# Patient Record
Sex: FEMALE | Race: WHITE | NOT HISPANIC OR LATINO | Employment: UNEMPLOYED | URBAN - METROPOLITAN AREA
[De-identification: names, ages, dates, MRNs, and addresses within clinical notes are randomized per-mention and may not be internally consistent; named-entity substitution may affect disease eponyms.]

---

## 2018-02-21 ENCOUNTER — HISTORICAL (OUTPATIENT)
Dept: ADMINISTRATIVE | Facility: HOSPITAL | Age: 28
End: 2018-02-21

## 2018-02-22 LAB — FINAL CULTURE: NORMAL

## 2019-11-03 ENCOUNTER — HISTORICAL (OUTPATIENT)
Dept: ADMINISTRATIVE | Facility: HOSPITAL | Age: 29
End: 2019-11-03

## 2019-11-09 LAB
FINAL CULTURE: NORMAL
FINAL CULTURE: NORMAL

## 2020-12-01 ENCOUNTER — HISTORICAL (OUTPATIENT)
Dept: ADMINISTRATIVE | Facility: HOSPITAL | Age: 30
End: 2020-12-01

## 2020-12-01 LAB
ALBUMIN SERPL-MCNC: 4 GM/DL (ref 3.5–5)
ALBUMIN/GLOB SERPL: 1.1 RATIO (ref 1.1–2)
ALP SERPL-CCNC: 90 UNIT/L (ref 40–150)
ALT SERPL-CCNC: 31 UNIT/L (ref 0–55)
AST SERPL-CCNC: 18 UNIT/L (ref 5–34)
B-HCG SERPL QL: NEGATIVE
BILIRUB SERPL-MCNC: 0.3 MG/DL
BILIRUBIN DIRECT+TOT PNL SERPL-MCNC: 0.1 MG/DL (ref 0–0.5)
BILIRUBIN DIRECT+TOT PNL SERPL-MCNC: 0.2 MG/DL (ref 0–0.8)
BUN SERPL-MCNC: 12 MG/DL (ref 7–18.7)
CALCIUM SERPL-MCNC: 9.5 MG/DL (ref 8.4–10.2)
CHLORIDE SERPL-SCNC: 104 MMOL/L (ref 98–107)
CO2 SERPL-SCNC: 23 MMOL/L (ref 22–29)
CORTIS SERPL-SCNC: 6.8 UG/DL
CREAT SERPL-MCNC: 0.84 MG/DL (ref 0.55–1.02)
DEPRECATED CALCIDIOL+CALCIFEROL SERPL-MC: 21.3 NG/ML (ref 30–80)
EST CREAT CLEARANCE SER (OHS): 86.38 ML/MIN
ESTRADIOL SERPL HS-MCNC: 122 PG/ML
FSH SERPL-ACNC: 2.13 MIU/ML
GLOBULIN SER-MCNC: 3.8 GM/DL (ref 2.4–3.5)
GLUCOSE SERPL-MCNC: 87 MG/DL (ref 74–100)
LH SERPL-ACNC: 1.79 MIU/ML
POTASSIUM SERPL-SCNC: 4 MMOL/L (ref 3.5–5.1)
PROLACTIN LEVEL (OHS): 46.87 NG/ML (ref 5.18–26.53)
PROT SERPL-MCNC: 7.8 GM/DL (ref 6.4–8.3)
SODIUM SERPL-SCNC: 137 MMOL/L (ref 136–145)
T3FREE SERPL-MCNC: 2.88 PG/ML (ref 1.71–3.71)
T4 FREE SERPL-MCNC: 0.69 NG/DL (ref 0.7–1.48)
TSH SERPL-ACNC: 0.67 UIU/ML (ref 0.35–4.94)

## 2020-12-21 ENCOUNTER — HISTORICAL (OUTPATIENT)
Dept: ADMINISTRATIVE | Facility: HOSPITAL | Age: 30
End: 2020-12-21

## 2020-12-21 LAB
CORTIS 30M P CHAL SERPL-SCNC: 15.5 UG/DL
CORTIS SERPL-SCNC: <1 UG/DL

## 2021-01-04 ENCOUNTER — HISTORICAL (OUTPATIENT)
Dept: ADMINISTRATIVE | Facility: HOSPITAL | Age: 31
End: 2021-01-04

## 2021-01-04 LAB
CORTIS 1H P CHAL SERPL-SCNC: 16.6 UG/DL
CORTIS 30M P CHAL SERPL-SCNC: 16.3 UG/DL
CORTIS SERPL-SCNC: 5.5 UG/DL
PROLACTIN LEVEL (OHS): 35.25 NG/ML (ref 5.18–26.53)

## 2022-04-10 ENCOUNTER — HISTORICAL (OUTPATIENT)
Dept: ADMINISTRATIVE | Facility: HOSPITAL | Age: 32
End: 2022-04-10
Payer: MEDICAID

## 2022-04-26 VITALS
HEIGHT: 65 IN | DIASTOLIC BLOOD PRESSURE: 82 MMHG | WEIGHT: 293 LBS | SYSTOLIC BLOOD PRESSURE: 122 MMHG | BODY MASS INDEX: 48.82 KG/M2

## 2022-05-02 DIAGNOSIS — D35.2 HYPERPROLACTINOMA: ICD-10-CM

## 2022-05-02 DIAGNOSIS — E27.40 ADRENAL INSUFFICIENCY: Primary | ICD-10-CM

## 2022-05-03 NOTE — HISTORICAL [facility] CERNER
This is a historical note converted from [employer]. Formatting and pictures may have been removed.  Please reference Oscar for original formatting and attached multimedia. Chief Complaint  referral adrenal insufficiency  History of Present Illness  31yo F with past medical history of Addisons Disease, Tobacco User, and Obesity presents to Endocrine to establish care for adrenal insufficiency. Labs on 8/4/2020 showed baseline cortisol in the AM of <1.0, alpha-subunit pituitary tumor marker of 0.2, estradiol <10, TSH & free T4 WNL, FSH 2.6, LH 0.61, Prolactin level of 31.8, 21-hydroxylase <1. ?However further investigation revealed that she received a steroid injection earlier that day?at another hospital for migraine. MRI Brain w/ & w/o Contrast in 2018? showed empty sella syndrome. Currently on hydrocortisone 20mg TID, ran out of medications about 2 months ago. Have lost ~20lbs, decreased appetite.?Diagnosed 2 years. Used to see Dr. Berger, endocrinologist in  over a year ago. Endorsed generalized fatigue/weakness, diffuse?body aches, nausea, diarrhea, and chronic?HA.?Taking OTC energy supplements called Renal Caps that seem to help. Sees Neurology for chronic headaches. ?Denies any?complicated pregnancies?or deliveries?including hemorrhaging. ?Patient currently has a copper IUD in place. Endorsed irregular periods, LMP 1.5 month ago.  Review of Systems  11 point ROS reviewed and negative except as noted above  Physical Exam  Vitals & Measurements  T:?36.7? ?C (Oral)? HR:?87(Peripheral)? RR:?18? BP:?130/82?  HT:?165.00?cm? WT:?135.000?kg? BMI:?49.59?  General: AAO x3,?well-developed, in no acute distress, obese female sitting comfortably in the chair  Eye: PERRLA, EOMI, clear conjunctiva, eyelids normal, no scleral icterus  Respiratory:? normal respiratory effort, equal breath sounds B/L, clear to auscultation B/L, no wheezes, crackles, or rales  Cardiovascular:?RRR without murmurs, gallops or rubs, normal S1/S1,  normal peripheral pulses  Gastrointestinal:?non-distended, normal BS, soft, non-tender, without masses to palpation  Musculoskeletal:?normal active and passive ROM without pain, no swelling/edema  Integumentary: no rashes or skin lesions present, warm, dry, and normal tone for race  Neurologic: moves all four extremities spontaneously, cognition intact, no facial droop noted, speech clear and coherent  Assessment/Plan  Adrenal Insufficiency  Ordered ACTH level, Cortisol AM level, IGF -1, Prolactin, FSH/LH, TSH/free T4, and Vitamin D  Unclear if patient truly has adrenal insufficiency per records  Will hold off on restarting hydrocortisone at this time  ?  Empty Sella Syndrome  Denies any complications per past pregnancies/deliveries  MRI in 2018 showed no pituitary  ?  RTC prn labs  Referrals  Clinic Follow-up PRN, 12/01/20 11:41:00 CST, F/u TBD based on labs   Problem List/Past Medical History  Ongoing  Hardee disease  Tobacco user  Historical  History of gestational diabetes mellitus  Procedure/Surgical History  Delivery of Products of Conception, External Approach (03/12/2018)  Introduction of Anesthetic Agent into Epidural Space, Percutaneous Approach (03/12/2018)  tubes in ears   Medications  Cheratussin AC 10 mg-100 mg/5 mL oral syrup, 15 mL, Oral, q4hr, PRN,? ?Not taking  hydrocortisone 20 mg oral tablet, 20 mg= 1 tab(s), Oral, TID,? ?Not taking: out  Renal Caps oral capsule, 2 cap(s), Oral, Daily  Allergies  aspirin?(Hives)  Social History  Abuse/Neglect  No, No, Yes, 11/19/2019  Alcohol  Never, 09/19/2017  Employment/School  Unemployed, 03/12/2018  Exercise  Exercise duration: 0., 12/01/2020  Home/Environment  Lives with Children, step mom, and dad, grandma. Alcohol abuse in household: No. Substance abuse in household: No. Smoker in household: No. Injuries/Abuse/Neglect in household: No. Feels unsafe at home: No., 03/12/2018    Never in , 12/01/2020  Sexual  Sexually active: Yes.,  09/19/2017  Spiritual/Cultural  Non denomination, 12/01/2020  Substance Use  Never, 09/19/2017  Tobacco  10 or more cigarettes (1/2 pack or more)/day in last 30 days, Cigarettes, No, 11/19/2019  Family History  Cancer: Father and Brother.  Cervical cancer: Mother.  Diabetes mellitus: Mother and Grandmother.  Heart disease: Grandmother.  Health Maintenance  Health Maintenance  ???Pending?(in the next year)  ??? ??OverDue  ??? ? ? ?Influenza Vaccine due??10/01/20??and every 1??day(s)  ??? ??Due?  ??? ? ? ?ADL Screening due??12/01/20??and every 1??year(s)  ??? ? ? ?Tetanus Vaccine due??12/01/20??and every 10??year(s)  ??? ??Postponed?  ??? ? ? ?Smoking Cessation due??11/26/21??and every 1??year(s)  ??? ??Due In Future?  ??? ? ? ?Cervical Cancer Screening not due until??12/27/20??and every 3??year(s)  ??? ? ? ?Obesity Screening not due until??01/01/21??and every 1??year(s)  ??? ? ? ?Alcohol Misuse Screening not due until??01/02/21??and every 1??year(s)  ???Satisfied?(in the past 1 year)  ??? ??Satisfied?  ??? ? ? ?Alcohol Misuse Screening on??12/01/20.??Satisfied by Kay Wise  ??? ? ? ?Blood Pressure Screening on??12/01/20.??Satisfied by Kay Wise  ??? ? ? ?Body Mass Index Check on??12/01/20.??Satisfied by Kay Wise  ??? ? ? ?Depression Screening on??12/01/20.??Satisfied by Kay Wise  ??? ? ? ?Influenza Vaccine on??12/01/20.??Satisfied by Kay Wise  ??? ? ? ?Obesity Screening on??12/01/20.??Satisfied by Kay Wise  ??? ??Postponed?  ??? ? ? ?Smoking Cessation on??12/01/20.??Recorded by Kay Wise  ?      I saw the patient with the resident and discussed the case, assisted with the development of the assessment and agree with the plan of care in as much as pt previously dx w/ AI however low cortisol was done in the afternoon after having received dex shot earlier that day, now on high doses of hydrocortisone for extended period w/ wt gain then now off it 2  months w/ wt loss.? Prior eval showed empty sella, elevated prl, low estradiol, c/o oligomenorrhea only w/ Cu IUD.? Will further sort via labs to guide mgmt.   Also w/ elevated alk phos in the past, will also check vit d.

## 2022-05-16 ENCOUNTER — TELEPHONE (OUTPATIENT)
Dept: ENDOCRINOLOGY | Facility: CLINIC | Age: 32
End: 2022-05-16
Payer: MEDICAID

## 2022-05-16 DIAGNOSIS — E27.40 ADRENAL INSUFFICIENCY: Primary | ICD-10-CM

## 2022-05-16 NOTE — TELEPHONE ENCOUNTER
Epic would not let me sign the 30 minute cortisol so added two additional cortisol orders for a total of three.  Please review and verify all is now correct.

## 2022-05-16 NOTE — TELEPHONE ENCOUNTER
Orders reviewed, appears only one cortisol order is present, suspect for the stim need a total of 3, one for baseline, one for 30 minutes, and one for 60 minutes.

## 2022-05-18 ENCOUNTER — TELEPHONE (OUTPATIENT)
Dept: ENDOCRINOLOGY | Facility: CLINIC | Age: 32
End: 2022-05-18
Payer: MEDICAID

## 2022-05-18 DIAGNOSIS — E23.6 EMPTY SELLA: ICD-10-CM

## 2022-05-18 DIAGNOSIS — E23.6 HYPOPROLACTINEMIA: ICD-10-CM

## 2022-05-18 RX ORDER — COSYNTROPIN 0.25 MG/ML
0.25 INJECTION, POWDER, FOR SOLUTION INTRAMUSCULAR; INTRAVENOUS
Status: DISCONTINUED | OUTPATIENT
Start: 2022-05-19 | End: 2022-07-14

## 2022-07-14 ENCOUNTER — TELEPHONE (OUTPATIENT)
Dept: ENDOCRINOLOGY | Facility: CLINIC | Age: 32
End: 2022-07-14
Payer: MEDICAID

## 2022-07-14 DIAGNOSIS — E27.40 ADRENAL INSUFFICIENCY: Primary | ICD-10-CM

## 2022-07-14 RX ORDER — COSYNTROPIN 0.25 MG/ML
0.25 INJECTION, POWDER, FOR SOLUTION INTRAMUSCULAR; INTRAVENOUS
Status: SHIPPED | OUTPATIENT
Start: 2022-07-18

## 2022-07-14 NOTE — TELEPHONE ENCOUNTER
Looks like there are still orders for the CMP and prl that I want as well.  Otherwise nothing else on top of the alondra stim w/ ACTH.

## 2022-07-14 NOTE — TELEPHONE ENCOUNTER
Terra was finally able to reach patient and she is schedule for Saul stim on Monday, labs in system are not , but will need new rx for Cosyntropin, do you want to add additional labs?